# Patient Record
Sex: MALE | Race: BLACK OR AFRICAN AMERICAN
[De-identification: names, ages, dates, MRNs, and addresses within clinical notes are randomized per-mention and may not be internally consistent; named-entity substitution may affect disease eponyms.]

---

## 2019-01-04 ENCOUNTER — HOSPITAL ENCOUNTER (OUTPATIENT)
Dept: HOSPITAL 62 - RAD | Age: 44
End: 2019-01-04
Payer: COMMERCIAL

## 2019-01-04 DIAGNOSIS — M25.552: ICD-10-CM

## 2019-01-04 DIAGNOSIS — M25.551: Primary | ICD-10-CM

## 2019-01-04 PROCEDURE — 20610 DRAIN/INJ JOINT/BURSA W/O US: CPT

## 2019-01-04 PROCEDURE — 77002 NEEDLE LOCALIZATION BY XRAY: CPT

## 2019-01-04 NOTE — RADIOLOGY REPORT (SQ)
EXAM DESCRIPTION:  INJECT/ASPIR HIP/SHLDR/KNEE; FLUORO/NEEDLE PLACEMENT



COMPLETED DATE/TIME:  1/4/2019 2:25 pm; 1/4/2019 2:32 pm; 1/4/2019 2:33 pm



REASON FOR STUDY:  PAIN IN RIGHT HIP (M25.551); PAIN IN LEFT HIP (M25.552) M25.551  PAIN IN RIGHT HIP
 M25.552  PAIN IN LEFT HIP



COMPARISON:  None.



FLUOROSCOPY TIME:  28 seconds

2 images saved to PACS.



LIMITATIONS:  None.



PROCEDURE:  SITE OF INJECTION: Right hip followed by left hip.

LOCALIZING CONTRAST TYPE AND DOSE: Fluoroscopy.

MEDICATION TYPE AND DOSE: 5 cc 0.5% bupivacaine per joint.  40 mg of Depo-Medrol per joint.

Using local anesthesia and sterile technique with fluoroscopic guidance, the needle was advanced into
 the right hip joint. Iodinated contrast (Omnipaque 300) was injected to verify intraarticular placem
ent.  This was followed by therapeutic injection of the indicated medications.  The needle was remove
d.  The patient was re-prepped and draped and attention was focused on the left hip.  Using local ane
sthesia and sterile technique with fluoroscopic guidance, the needle was advanced into the left hip j
oint.  Small amount of contrast was injected to confirm intra-articular location.  This was followed 
by therapeutic injection of the indicated medications.  The needle was removed.  There were no immedi
ate complications.

Preprocedure pain level:  Left hip:  10/10.  Right hip:  8/10

Postprocedure pain level:  Left hip:  6/10.  Right hip:  8/10



IMPRESSION:  THERAPEUTIC INJECTION OF BOTH HIP JOINTS AS ABOVE.



COMMENT:  Patient medication list reviewed: Yes- Quality ID# [id number]:Eligible professional attests to doc
umenting in the medical record they obtained, updated, or reviewed the patient's current medications.
.

Quality :  Final reports for procedures using fluoroscopy that document radiation exposure maci
bob, or exposure time and number of fluorographic images (if radiation exposure indices are not avail
able)



TECHNICAL DOCUMENTATION:  JOB ID:  [id number]

 2011 [employer]- All Rights Reserved



Reading location - IP/workstation name: [facility]

## 2019-01-04 NOTE — RADIOLOGY REPORT (SQ)
EXAM DESCRIPTION:  INJECT/ASPIR HIP/SHLDR/KNEE; FLUORO/NEEDLE PLACEMENT



COMPLETED DATE/TIME:  1/4/2019 2:25 pm; 1/4/2019 2:32 pm; 1/4/2019 2:33 pm



REASON FOR STUDY:  PAIN IN RIGHT HIP (M25.551); PAIN IN LEFT HIP (M25.552) M25.551  PAIN IN RIGHT HIP
 M25.552  PAIN IN LEFT HIP



COMPARISON:  None.



FLUOROSCOPY TIME:  28 seconds

2 images saved to PACS.



LIMITATIONS:  None.



PROCEDURE:  SITE OF INJECTION: Right hip followed by left hip.

LOCALIZING CONTRAST TYPE AND DOSE: Fluoroscopy.

MEDICATION TYPE AND DOSE: 5 cc 0.5% bupivacaine per joint.  40 mg of Depo-Medrol per joint.

Using local anesthesia and sterile technique with fluoroscopic guidance, the needle was advanced into
 the right hip joint. Iodinated contrast (Omnipaque 300) was injected to verify intraarticular placem
ent.  This was followed by therapeutic injection of the indicated medications.  The needle was remove
d.  The patient was re-prepped and draped and attention was focused on the left hip.  Using local ane
sthesia and sterile technique with fluoroscopic guidance, the needle was advanced into the left hip j
oint.  Small amount of contrast was injected to confirm intra-articular location.  This was followed 
by therapeutic injection of the indicated medications.  The needle was removed.  There were no immedi
ate complications.

Preprocedure pain level:  Left hip:  10/10.  Right hip:  8/10

Postprocedure pain level:  Left hip:  6/10.  Right hip:  8/10



IMPRESSION:  THERAPEUTIC INJECTION OF BOTH HIP JOINTS AS ABOVE.



COMMENT:  Patient medication list reviewed: Yes- Quality ID# [id number]:Eligible professional attests to doc
umenting in the medical record they obtained, updated, or reviewed the patient's current medications.
.

Quality :  Final reports for procedures using fluoroscopy that document radiation exposure maci
bob, or exposure time and number of fluorographic images (if radiation exposure indices are not avail
able)



TECHNICAL DOCUMENTATION:  JOB ID:  [id number]

 2011 [employer]- All Rights Reserved



Reading location - IP/workstation name: [facility]

## 2019-01-04 NOTE — RADIOLOGY REPORT (SQ)
EXAM DESCRIPTION:  INJECT/ASPIR HIP/SHLDR/KNEE; FLUORO/NEEDLE PLACEMENT



COMPLETED DATE/TIME:  1/4/2019 2:25 pm; 1/4/2019 2:32 pm; 1/4/2019 2:33 pm



REASON FOR STUDY:  PAIN IN RIGHT HIP (M25.551); PAIN IN LEFT HIP (M25.552) M25.551  PAIN IN RIGHT HIP
 M25.552  PAIN IN LEFT HIP



COMPARISON:  None.



FLUOROSCOPY TIME:  28 seconds

2 images saved to PACS.



LIMITATIONS:  None.



PROCEDURE:  SITE OF INJECTION: Right hip followed by left hip.

LOCALIZING CONTRAST TYPE AND DOSE: Fluoroscopy.

MEDICATION TYPE AND DOSE: 5 cc 0.5% bupivacaine per joint.  40 mg of Depo-Medrol per joint.

Using local anesthesia and sterile technique with fluoroscopic guidance, the needle was advanced into
 the right hip joint. Iodinated contrast (Omnipaque 300) was injected to verify intraarticular placem
ent.  This was followed by therapeutic injection of the indicated medications.  The needle was remove
d.  The patient was re-prepped and draped and attention was focused on the left hip.  Using local ane
sthesia and sterile technique with fluoroscopic guidance, the needle was advanced into the left hip j
oint.  Small amount of contrast was injected to confirm intra-articular location.  This was followed 
by therapeutic injection of the indicated medications.  The needle was removed.  There were no immedi
ate complications.

Preprocedure pain level:  Left hip:  10/10.  Right hip:  8/10

Postprocedure pain level:  Left hip:  6/10.  Right hip:  8/10



IMPRESSION:  THERAPEUTIC INJECTION OF BOTH HIP JOINTS AS ABOVE.



COMMENT:  Patient medication list reviewed: Yes- Quality ID# [id number]:Eligible professional attests to doc
umenting in the medical record they obtained, updated, or reviewed the patient's current medications.
.

Quality :  Final reports for procedures using fluoroscopy that document radiation exposure maci
bob, or exposure time and number of fluorographic images (if radiation exposure indices are not avail
able)



TECHNICAL DOCUMENTATION:  JOB ID:  [id number]

 2011 [employer]- All Rights Reserved



Reading location - IP/workstation name: [facility]

## 2019-01-04 NOTE — RADIOLOGY REPORT (SQ)
EXAM DESCRIPTION:  INJECT/ASPIR HIP/SHLDR/KNEE; FLUORO/NEEDLE PLACEMENT



COMPLETED DATE/TIME:  1/4/2019 2:25 pm; 1/4/2019 2:32 pm; 1/4/2019 2:33 pm



REASON FOR STUDY:  PAIN IN RIGHT HIP (M25.551); PAIN IN LEFT HIP (M25.552) M25.551  PAIN IN RIGHT HIP
 M25.552  PAIN IN LEFT HIP



COMPARISON:  None.



FLUOROSCOPY TIME:  28 seconds

2 images saved to PACS.



LIMITATIONS:  None.



PROCEDURE:  SITE OF INJECTION: Right hip followed by left hip.

LOCALIZING CONTRAST TYPE AND DOSE: Fluoroscopy.

MEDICATION TYPE AND DOSE: 5 cc 0.5% bupivacaine per joint.  40 mg of Depo-Medrol per joint.

Using local anesthesia and sterile technique with fluoroscopic guidance, the needle was advanced into
 the right hip joint. Iodinated contrast (Omnipaque 300) was injected to verify intraarticular placem
ent.  This was followed by therapeutic injection of the indicated medications.  The needle was remove
d.  The patient was re-prepped and draped and attention was focused on the left hip.  Using local ane
sthesia and sterile technique with fluoroscopic guidance, the needle was advanced into the left hip j
oint.  Small amount of contrast was injected to confirm intra-articular location.  This was followed 
by therapeutic injection of the indicated medications.  The needle was removed.  There were no immedi
ate complications.

Preprocedure pain level:  Left hip:  10/10.  Right hip:  8/10

Postprocedure pain level:  Left hip:  6/10.  Right hip:  8/10



IMPRESSION:  THERAPEUTIC INJECTION OF BOTH HIP JOINTS AS ABOVE.



COMMENT:  Patient medication list reviewed: Yes- Quality ID# [id number]:Eligible professional attests to doc
umenting in the medical record they obtained, updated, or reviewed the patient's current medications.
.

Quality :  Final reports for procedures using fluoroscopy that document radiation exposure maci
bob, or exposure time and number of fluorographic images (if radiation exposure indices are not avail
able)



TECHNICAL DOCUMENTATION:  JOB ID:  [id number]

 2011 [employer]- All Rights Reserved



Reading location - IP/workstation name: [facility]